# Patient Record
Sex: FEMALE | Race: WHITE | NOT HISPANIC OR LATINO | URBAN - METROPOLITAN AREA
[De-identification: names, ages, dates, MRNs, and addresses within clinical notes are randomized per-mention and may not be internally consistent; named-entity substitution may affect disease eponyms.]

---

## 2017-01-12 ENCOUNTER — HISTORICAL (OUTPATIENT)
Dept: BARIATRICS | Facility: HOSPITAL | Age: 52
End: 2017-01-12

## 2017-03-24 ENCOUNTER — HISTORICAL (OUTPATIENT)
Dept: PREADMISSION TESTING | Facility: HOSPITAL | Age: 52
End: 2017-03-24

## 2017-04-04 ENCOUNTER — HISTORICAL (OUTPATIENT)
Dept: BARIATRICS | Facility: HOSPITAL | Age: 52
End: 2017-04-04

## 2017-11-09 ENCOUNTER — HISTORICAL (OUTPATIENT)
Dept: RADIOLOGY | Facility: HOSPITAL | Age: 52
End: 2017-11-09

## 2019-09-09 ENCOUNTER — HISTORICAL (OUTPATIENT)
Dept: ADMINISTRATIVE | Facility: HOSPITAL | Age: 54
End: 2019-09-09

## 2019-09-09 LAB
ABS NEUT (OLG): 3.59 X10(3)/MCL (ref 2.1–9.2)
ALBUMIN SERPL-MCNC: 3.7 GM/DL (ref 3.4–5)
ALBUMIN/GLOB SERPL: 1.2 RATIO (ref 1.1–2)
ALP SERPL-CCNC: 49 UNIT/L (ref 38–126)
ALT SERPL-CCNC: 27 UNIT/L (ref 12–78)
APPEARANCE, UA: CLEAR
APTT PPP: 29.8 SECOND(S) (ref 24.2–33.9)
AST SERPL-CCNC: 16 UNIT/L (ref 15–37)
BACTERIA SPEC CULT: ABNORMAL /HPF
BASOPHILS # BLD AUTO: 0 X10(3)/MCL (ref 0–0.2)
BASOPHILS NFR BLD AUTO: 1 %
BILIRUB SERPL-MCNC: 0.2 MG/DL (ref 0.2–1)
BILIRUB UR QL STRIP: NEGATIVE
BILIRUBIN DIRECT+TOT PNL SERPL-MCNC: 0.1 MG/DL (ref 0–0.5)
BILIRUBIN DIRECT+TOT PNL SERPL-MCNC: 0.1 MG/DL (ref 0–0.8)
BUN SERPL-MCNC: 14 MG/DL (ref 7–18)
CALCIUM SERPL-MCNC: 9 MG/DL (ref 8.5–10.1)
CHLORIDE SERPL-SCNC: 106 MMOL/L (ref 98–107)
CO2 SERPL-SCNC: 27 MMOL/L (ref 21–32)
COLOR UR: YELLOW
CREAT SERPL-MCNC: 0.72 MG/DL (ref 0.55–1.02)
EOSINOPHIL # BLD AUTO: 0.1 X10(3)/MCL (ref 0–0.9)
EOSINOPHIL NFR BLD AUTO: 2 %
ERYTHROCYTE [DISTWIDTH] IN BLOOD BY AUTOMATED COUNT: 13.6 % (ref 11.5–17)
GLOBULIN SER-MCNC: 3.1 GM/DL (ref 2.4–3.5)
GLUCOSE (UA): NEGATIVE
GLUCOSE SERPL-MCNC: 85 MG/DL (ref 74–106)
HCT VFR BLD AUTO: 34.3 % (ref 37–47)
HGB BLD-MCNC: 10.4 GM/DL (ref 12–16)
HGB UR QL STRIP: NEGATIVE
INR PPP: 1.1 (ref 0–1.3)
KETONES UR QL STRIP: ABNORMAL
LEUKOCYTE ESTERASE UR QL STRIP: NEGATIVE
LYMPHOCYTES # BLD AUTO: 1.5 X10(3)/MCL (ref 0.6–4.6)
LYMPHOCYTES NFR BLD AUTO: 27 %
MCH RBC QN AUTO: 25.7 PG (ref 27–31)
MCHC RBC AUTO-ENTMCNC: 30.3 GM/DL (ref 33–36)
MCV RBC AUTO: 84.7 FL (ref 80–94)
MONOCYTES # BLD AUTO: 0.5 X10(3)/MCL (ref 0.1–1.3)
MONOCYTES NFR BLD AUTO: 8 %
NEUTROPHILS # BLD AUTO: 3.59 X10(3)/MCL (ref 2.1–9.2)
NEUTROPHILS NFR BLD AUTO: 62 %
NITRITE UR QL STRIP: NEGATIVE
PH UR STRIP: 5.5 [PH] (ref 5–9)
PLATELET # BLD AUTO: 218 X10(3)/MCL (ref 130–400)
PMV BLD AUTO: 10.2 FL (ref 9.4–12.4)
POTASSIUM SERPL-SCNC: 4.4 MMOL/L (ref 3.5–5.1)
PREALB SERPL-MCNC: 20.4 MG/DL (ref 18–38)
PROT SERPL-MCNC: 6.8 GM/DL (ref 6.4–8.2)
PROT UR QL STRIP: NEGATIVE
PROTHROMBIN TIME: 13.9 SECOND(S) (ref 12–14)
RBC # BLD AUTO: 4.05 X10(6)/MCL (ref 4.2–5.4)
RBC #/AREA URNS HPF: 6 /HPF (ref 0–2)
SODIUM SERPL-SCNC: 141 MMOL/L (ref 136–145)
SP GR UR STRIP: 1.02 (ref 1–1.03)
SQUAMOUS EPITHELIAL, UA: ABNORMAL
UROBILINOGEN UR STRIP-ACNC: 1
WBC # SPEC AUTO: 5.8 X10(3)/MCL (ref 4.5–11.5)
WBC #/AREA URNS HPF: ABNORMAL /[HPF]

## 2019-09-18 ENCOUNTER — HISTORICAL (OUTPATIENT)
Dept: RADIOLOGY | Facility: HOSPITAL | Age: 54
End: 2019-09-18

## 2019-10-03 ENCOUNTER — HISTORICAL (OUTPATIENT)
Dept: SURGERY | Facility: HOSPITAL | Age: 54
End: 2019-10-03

## 2019-10-03 LAB — POC BETA-HCG (QUAL): NEGATIVE

## 2019-10-21 ENCOUNTER — HISTORICAL (OUTPATIENT)
Dept: PREADMISSION TESTING | Facility: HOSPITAL | Age: 54
End: 2019-10-21

## 2019-10-21 LAB
ABS NEUT (OLG): 3.26 X10(3)/MCL (ref 2.1–9.2)
ALBUMIN SERPL-MCNC: 3.3 GM/DL (ref 3.4–5)
ALBUMIN/GLOB SERPL: 0.9 RATIO (ref 1.1–2)
ALP SERPL-CCNC: 52 UNIT/L (ref 38–126)
ALT SERPL-CCNC: 30 UNIT/L (ref 12–78)
AST SERPL-CCNC: 21 UNIT/L (ref 15–37)
BASOPHILS # BLD AUTO: 0.1 X10(3)/MCL (ref 0–0.2)
BASOPHILS NFR BLD AUTO: 1 %
BILIRUB SERPL-MCNC: 0.3 MG/DL (ref 0.2–1)
BILIRUBIN DIRECT+TOT PNL SERPL-MCNC: 0.1 MG/DL (ref 0–0.5)
BILIRUBIN DIRECT+TOT PNL SERPL-MCNC: 0.2 MG/DL (ref 0–0.8)
BUN SERPL-MCNC: 12 MG/DL (ref 7–18)
CALCIUM SERPL-MCNC: 8.9 MG/DL (ref 8.5–10.1)
CHLORIDE SERPL-SCNC: 109 MMOL/L (ref 98–107)
CO2 SERPL-SCNC: 30 MMOL/L (ref 21–32)
CREAT SERPL-MCNC: 0.72 MG/DL (ref 0.55–1.02)
EOSINOPHIL # BLD AUTO: 0.1 X10(3)/MCL (ref 0–0.9)
EOSINOPHIL NFR BLD AUTO: 2 %
ERYTHROCYTE [DISTWIDTH] IN BLOOD BY AUTOMATED COUNT: 17.1 % (ref 11.5–17)
GLOBULIN SER-MCNC: 3.5 GM/DL (ref 2.4–3.5)
GLUCOSE SERPL-MCNC: 80 MG/DL (ref 74–106)
HCT VFR BLD AUTO: 31.9 % (ref 37–47)
HGB BLD-MCNC: 9.4 GM/DL (ref 12–16)
IRON SATN MFR SERPL: 11.8 % (ref 20–50)
IRON SERPL-MCNC: 50 MCG/DL (ref 50–175)
LYMPHOCYTES # BLD AUTO: 1.2 X10(3)/MCL (ref 0.6–4.6)
LYMPHOCYTES NFR BLD AUTO: 23 %
MCH RBC QN AUTO: 26.2 PG (ref 27–31)
MCHC RBC AUTO-ENTMCNC: 29.5 GM/DL (ref 33–36)
MCV RBC AUTO: 88.9 FL (ref 80–94)
MONOCYTES # BLD AUTO: 0.6 X10(3)/MCL (ref 0.1–1.3)
MONOCYTES NFR BLD AUTO: 11 %
NEUTROPHILS # BLD AUTO: 3.26 X10(3)/MCL (ref 2.1–9.2)
NEUTROPHILS NFR BLD AUTO: 63 %
PLATELET # BLD AUTO: 497 X10(3)/MCL (ref 130–400)
PMV BLD AUTO: 9.5 FL (ref 9.4–12.4)
POTASSIUM SERPL-SCNC: 4.9 MMOL/L (ref 3.5–5.1)
PROT SERPL-MCNC: 6.8 GM/DL (ref 6.4–8.2)
RBC # BLD AUTO: 3.59 X10(6)/MCL (ref 4.2–5.4)
SODIUM SERPL-SCNC: 142 MMOL/L (ref 136–145)
TIBC SERPL-MCNC: 425 MCG/DL (ref 250–450)
TRANSFERRIN SERPL-MCNC: 356 MG/DL (ref 200–360)
VIT B12 SERPL-MCNC: 1046 PG/ML (ref 193–986)
WBC # SPEC AUTO: 5.2 X10(3)/MCL (ref 4.5–11.5)

## 2020-01-15 ENCOUNTER — HISTORICAL (OUTPATIENT)
Dept: ADMINISTRATIVE | Facility: HOSPITAL | Age: 55
End: 2020-01-15

## 2020-01-15 LAB
ABS NEUT (OLG): 2.86 X10(3)/MCL (ref 2.1–9.2)
BASOPHILS # BLD AUTO: 0 X10(3)/MCL (ref 0–0.2)
BASOPHILS NFR BLD AUTO: 1 %
DEPRECATED CALCIDIOL+CALCIFEROL SERPL-MC: 72.55 NG/ML (ref 30–80)
EOSINOPHIL # BLD AUTO: 0.1 X10(3)/MCL (ref 0–0.9)
EOSINOPHIL NFR BLD AUTO: 2 %
ERYTHROCYTE [DISTWIDTH] IN BLOOD BY AUTOMATED COUNT: 15.9 % (ref 11.5–17)
ESTRADIOL SERPL HS-MCNC: 22 PG/ML
FSH SERPL-ACNC: 49.1 MIU/ML
HCT VFR BLD AUTO: 43 % (ref 37–47)
HGB BLD-MCNC: 13.5 GM/DL (ref 12–16)
LYMPHOCYTES # BLD AUTO: 1.6 X10(3)/MCL (ref 0.6–4.6)
LYMPHOCYTES NFR BLD AUTO: 32 %
MCH RBC QN AUTO: 27.4 PG (ref 27–31)
MCHC RBC AUTO-ENTMCNC: 31.4 GM/DL (ref 33–36)
MCV RBC AUTO: 87.4 FL (ref 80–94)
MONOCYTES # BLD AUTO: 0.4 X10(3)/MCL (ref 0.1–1.3)
MONOCYTES NFR BLD AUTO: 8 %
NEUTROPHILS # BLD AUTO: 2.86 X10(3)/MCL (ref 2.1–9.2)
NEUTROPHILS NFR BLD AUTO: 57 %
PLATELET # BLD AUTO: 215 X10(3)/MCL (ref 130–400)
PMV BLD AUTO: 9.9 FL (ref 9.4–12.4)
RBC # BLD AUTO: 4.92 X10(6)/MCL (ref 4.2–5.4)
WBC # SPEC AUTO: 5 X10(3)/MCL (ref 4.5–11.5)

## 2022-04-10 ENCOUNTER — HISTORICAL (OUTPATIENT)
Dept: ADMINISTRATIVE | Facility: HOSPITAL | Age: 57
End: 2022-04-10

## 2022-04-25 VITALS
WEIGHT: 156.94 LBS | SYSTOLIC BLOOD PRESSURE: 165 MMHG | BODY MASS INDEX: 29.63 KG/M2 | DIASTOLIC BLOOD PRESSURE: 102 MMHG | OXYGEN SATURATION: 98 % | HEIGHT: 61 IN

## 2022-06-15 ENCOUNTER — HOSPITAL ENCOUNTER (OUTPATIENT)
Dept: RADIOLOGY | Facility: HOSPITAL | Age: 57
Discharge: HOME | End: 2022-06-15
Attending: OBSTETRICS & GYNECOLOGY

## 2022-06-15 DIAGNOSIS — Z12.31 BREAST CANCER SCREENING BY MAMMOGRAM: ICD-10-CM

## 2022-06-15 PROCEDURE — 77067 SCR MAMMO BI INCL CAD: CPT | Mod: TC

## 2022-06-15 PROCEDURE — 77063 MAMMO DIGITAL SCREENING BILAT WITH TOMO: ICD-10-PCS | Mod: 26,,, | Performed by: RADIOLOGY

## 2022-06-15 PROCEDURE — 77067 SCR MAMMO BI INCL CAD: CPT | Mod: 26,,, | Performed by: RADIOLOGY

## 2022-06-15 PROCEDURE — 77063 BREAST TOMOSYNTHESIS BI: CPT | Mod: 26,,, | Performed by: RADIOLOGY

## 2022-06-15 PROCEDURE — 77067 MAMMO DIGITAL SCREENING BILAT WITH TOMO: ICD-10-PCS | Mod: 26,,, | Performed by: RADIOLOGY

## 2022-07-11 ENCOUNTER — HOSPITAL ENCOUNTER (OUTPATIENT)
Dept: RADIOLOGY | Facility: HOSPITAL | Age: 57
Discharge: HOME | End: 2022-07-11
Attending: OBSTETRICS & GYNECOLOGY

## 2022-07-11 DIAGNOSIS — R92.8 ABNORMAL MAMMOGRAM: ICD-10-CM

## 2022-07-11 PROCEDURE — 77065 DX MAMMO INCL CAD UNI: CPT | Mod: TC,RT

## 2022-07-11 PROCEDURE — 77061 BREAST TOMOSYNTHESIS UNI: CPT | Mod: 26,RT,, | Performed by: RADIOLOGY

## 2022-07-11 PROCEDURE — 77065 MAMMO DIGITAL DIAGNOSTIC RIGHT WITH TOMO: ICD-10-PCS | Mod: 26,RT,, | Performed by: RADIOLOGY

## 2022-07-11 PROCEDURE — 77061 MAMMO DIGITAL DIAGNOSTIC RIGHT WITH TOMO: ICD-10-PCS | Mod: 26,RT,, | Performed by: RADIOLOGY

## 2022-07-11 PROCEDURE — 76642 ULTRASOUND BREAST LIMITED: CPT | Mod: TC,RT

## 2022-07-11 PROCEDURE — 76642 US BREAST RIGHT LIMITED: ICD-10-PCS | Mod: 26,RT,, | Performed by: RADIOLOGY

## 2022-07-11 PROCEDURE — 76642 ULTRASOUND BREAST LIMITED: CPT | Mod: 26,RT,, | Performed by: RADIOLOGY

## 2022-07-11 PROCEDURE — 77065 DX MAMMO INCL CAD UNI: CPT | Mod: 26,RT,, | Performed by: RADIOLOGY

## 2023-07-13 ENCOUNTER — HOSPITAL ENCOUNTER (OUTPATIENT)
Dept: RADIOLOGY | Facility: HOSPITAL | Age: 58
Discharge: HOME | End: 2023-07-13
Attending: OBSTETRICS & GYNECOLOGY

## 2023-07-13 DIAGNOSIS — Z12.31 ENCOUNTER FOR SCREENING MAMMOGRAM FOR BREAST CANCER: ICD-10-CM

## 2023-07-13 PROCEDURE — 77067 MAMMO DIGITAL SCREENING BILAT WITH TOMO: ICD-10-PCS | Mod: 26,,, | Performed by: RADIOLOGY

## 2023-07-13 PROCEDURE — 77063 BREAST TOMOSYNTHESIS BI: CPT | Mod: 26,,, | Performed by: RADIOLOGY

## 2023-07-13 PROCEDURE — 77067 SCR MAMMO BI INCL CAD: CPT | Mod: 26,,, | Performed by: RADIOLOGY

## 2023-07-13 PROCEDURE — 77063 MAMMO DIGITAL SCREENING BILAT WITH TOMO: ICD-10-PCS | Mod: 26,,, | Performed by: RADIOLOGY

## 2024-07-23 ENCOUNTER — HOSPITAL ENCOUNTER (OUTPATIENT)
Dept: RADIOLOGY | Facility: HOSPITAL | Age: 59
Discharge: HOME | End: 2024-07-23
Attending: OBSTETRICS & GYNECOLOGY

## 2024-07-23 DIAGNOSIS — Z12.31 ENCOUNTER FOR SCREENING MAMMOGRAM FOR BREAST CANCER: ICD-10-CM

## 2024-07-23 PROCEDURE — 77063 BREAST TOMOSYNTHESIS BI: CPT | Mod: TC

## 2024-07-23 PROCEDURE — 77067 SCR MAMMO BI INCL CAD: CPT | Mod: TC

## 2024-11-25 ENCOUNTER — TELEPHONE (OUTPATIENT)
Dept: SURGERY | Facility: CLINIC | Age: 59
End: 2024-11-25

## 2024-11-25 NOTE — TELEPHONE ENCOUNTER
Pt called stating that her Protonix was denied   She had WLS in 2016 and is a private pay patient she would like to know if she could do a telemed bc she really needs her protonix   Is this possible to set up an appt self pay for telemed?

## 2024-11-26 DIAGNOSIS — K21.9 GASTROESOPHAGEAL REFLUX DISEASE, UNSPECIFIED WHETHER ESOPHAGITIS PRESENT: Primary | ICD-10-CM

## 2024-11-26 RX ORDER — PANTOPRAZOLE SODIUM 40 MG/1
40 TABLET, DELAYED RELEASE ORAL DAILY
Qty: 20 TABLET | Refills: 0 | Status: SHIPPED | OUTPATIENT
Start: 2024-11-26 | End: 2024-12-16

## 2024-11-26 NOTE — TELEPHONE ENCOUNTER
Pt stated that she does not see a pcp     Girls can yall schedule her thanks and let me know where she would like to have labs completed prior

## 2024-11-26 NOTE — TELEPHONE ENCOUNTER
Ok to take pepcid over the counter twice daily and/or nexium daily if the pepcid is not helping   What is her pharmacy?

## 2024-12-09 NOTE — PROGRESS NOTES
Established Patient - Audio Only Telehealth Visit     The patient location is: home   The chief complaint leading to consultation is: annual visit, gerd   Visit type: Virtual visit with audio only (telephone)  Total time spent with patient: 10    The reason for the audio only service rather than synchronous audio and video virtual visit was related to technical difficulties or patient preference/necessity.     Each patient to whom I provide medical services by telemedicine is:  (1) informed of the relationship between the physician and patient and the respective role of any other health care provider with respect to management of the patient; and (2) notified that they may decline to receive medical services by telemedicine and may withdraw from such care at any time. Patient verbally consented to receive this service via voice-only telephone call.    Progress Note  Carlita Radford  No chief complaint on file.    History of Present Illness:  Ms. Carlita Radford is a 59 y.o. female who is 8 years s/p lap gastric sleeve surgery on 04/12/16 by Jean-Claude Fry MD. Presents today for annual bariatric follow up appt. No complaints with tolerating PO. No dysphagia, odynophagia, +GERD, NV, or abd pain. Regular BMs.     Taking OTC mediation (Pepcid) for it but not  really helping much. denies regurgitation at night. +carb, not mixing solids and liquids, no processed foods. Has gained weight since low est of    138# which she did not have GERD at that time of lowest. Pt denies any abd pain, no NVD, no blood/black stools. +caffeine use. NO NSAIDS or  steroids. no dysphagia, or odynophagia.    Diet: compliant with bariatric meal plan  Exercise: regular exercise, walking   Vitamins: compliant with bariatric supplementation         Labs (12/3/24): ferritin: 14, all other labs WNLs        HT: 61in  Weights:   Trend Weights BMI   Starting 218    Lowest 138#    6 Year 155#    8 Year 152#              For Adults 20 Years and  "Older:  BMI Weight Status   Below 18.5 Underweight   18.6-24.9 Normal/Healthy   25.0-29.9 Overweight   30.0 & Above Obese     Ideal Weight Range for Your Height: 5'1" = 100 - 131 lbs       Pertinent History:  HTN - [] Yes   [x] No    [] Resolved  HLD - [] Yes   [x] No    [] Resolved  DM  -  [] Yes   [x] No    [] Resolved  TREY - [] Yes   [x] No   [] Resolved  GERD - [x] Yes   [] No [] Resolved  Anticoagulation- [] Yes   [x] No      If yes, type: [] ASA [] Plavix [] Other    Patient Care Team:  No, Primary Doctor as PCP - General  Jean-Claude Fry MD as Surgeon (Bariatrics)  Gloria Dexter MD (Cardiology)  Dee Burrell III, MD (Gastroenterology)  Emmanuel Gonzalez MD (Pulmonary Disease)     Subjective:     12 point review of systems conducted, negative except as stated in the history of present illness. See HPI for details.    Review of patient's allergies indicates:   Allergen Reactions    Penicillins      Past Medical History:   Diagnosis Date    Borderline high serum cholesterol     CAD (coronary artery disease)     CPAP (continuous positive airway pressure) dependence     GERD (gastroesophageal reflux disease)     Hyperlipidemia     Hypertension     Morbid obesity     Sleep apnea, unspecified      Past Surgical History:   Procedure Laterality Date    ESOPHAGOGASTRODUODENOSCOPY      LAPAROSCOPIC SLEEVE GASTRECTOMY  04/12/2016     Family History   Problem Relation Name Age of Onset    Thyroid disease Mother      Hypertension Sister      Heart disease Sister      Heart attack Sister       Social History     Tobacco Use    Smoking status: Never    Smokeless tobacco: Never   Substance Use Topics    Alcohol use: Not Currently      Current Outpatient Medications   Medication Instructions    pantoprazole (PROTONIX) 40 mg, Oral, Daily       Objective:     Vital Signs (Most Recent):  There were no vitals taken for this visit.    Physical Exam:  General:  Alert and oriented.    Psychiatric:  Cooperative.  "     Assessment:       ICD-10-CM ICD-9-CM   1. Encounter for weight loss counseling  Z71.3 V65.3   2. S/P gastric sleeve procedure  Z90.3 V45.75   3. Dietary counseling  Z71.3 V65.3   4. Exercise counseling  Z71.82 V65.41       Plan:     1. Encounter for weight loss counseling        2. S/P gastric sleeve procedure        3. Dietary counseling        4. Exercise counseling            - Discussed common downfalls of patients that gain weight after their first year such as adding in carbs too soon, popcorn or other snack type foods rather than eating protein forward meals, increasing nut intake, or not measuring amount when eating nuts since this can be very high in calories, as well as falling back into previous bad habits. Pt verbalized understanding of some of the issues her is doing wrong and will try and correct these.   - Discussed possible surgical risks with patient that can occur at any point in time such as but not limited to vitamin and mineral deficiency, ulceration from smoking/NSAIDs/Anti-inflammatory medications, gallbladder disfunction, etc. If patient has any severe abd pain that is constant, nausea, vomiting, disruption of bowel movements, or has other concerns they are instructed to call the office or present to the emergency room. Pt verbalized understanding   - F/U 1 year with bariatric labs (annually)  - Continue exercising and following bariatric vitamin supplementation  - Support group attendance encouraged   - Keep routine appts with PCP/specialists as scheduled      This service was not originating from a related E/M service provided within the previous 7 days nor will  to an E/M service or procedure within the next 24 hours or my soonest available appointment.  Prevailing standard of care was able to be met in this audio-only visit.

## 2024-12-10 ENCOUNTER — OFFICE VISIT (OUTPATIENT)
Dept: SURGERY | Facility: CLINIC | Age: 59
End: 2024-12-10

## 2024-12-10 DIAGNOSIS — Z71.3 DIETARY COUNSELING: ICD-10-CM

## 2024-12-10 DIAGNOSIS — Z71.82 EXERCISE COUNSELING: ICD-10-CM

## 2024-12-10 DIAGNOSIS — Z71.3 ENCOUNTER FOR WEIGHT LOSS COUNSELING: Primary | ICD-10-CM

## 2024-12-10 DIAGNOSIS — Z90.3 S/P GASTRIC SLEEVE PROCEDURE: ICD-10-CM

## 2024-12-10 PROCEDURE — 99214 OFFICE O/P EST MOD 30 MIN: CPT | Mod: 95,,, | Performed by: NURSE PRACTITIONER

## 2024-12-10 RX ORDER — PANTOPRAZOLE SODIUM 40 MG/1
40 TABLET, DELAYED RELEASE ORAL DAILY
Qty: 90 TABLET | Refills: 3 | Status: SHIPPED | OUTPATIENT
Start: 2024-12-10